# Patient Record
Sex: MALE | Race: BLACK OR AFRICAN AMERICAN | NOT HISPANIC OR LATINO | Employment: UNEMPLOYED | URBAN - METROPOLITAN AREA
[De-identification: names, ages, dates, MRNs, and addresses within clinical notes are randomized per-mention and may not be internally consistent; named-entity substitution may affect disease eponyms.]

---

## 2017-06-11 ENCOUNTER — HOSPITAL ENCOUNTER (EMERGENCY)
Facility: OTHER | Age: 42
Discharge: HOME | End: 2017-06-11
Attending: EMERGENCY MEDICINE

## 2017-06-11 VITALS
HEIGHT: 68 IN | HEART RATE: 62 BPM | WEIGHT: 145 LBS | OXYGEN SATURATION: 100 % | DIASTOLIC BLOOD PRESSURE: 122 MMHG | TEMPERATURE: 98 F | RESPIRATION RATE: 17 BRPM | BODY MASS INDEX: 21.98 KG/M2 | SYSTOLIC BLOOD PRESSURE: 197 MMHG

## 2017-06-11 DIAGNOSIS — I16.0 HYPERTENSIVE URGENCY: Primary | ICD-10-CM

## 2017-06-11 LAB
ALBUMIN SERPL BCP-MCNC: 3.6 G/DL
ALP SERPL-CCNC: 118 U/L
ALT SERPL W/O P-5'-P-CCNC: 8 U/L
ANION GAP SERPL CALC-SCNC: 10 MMOL/L
AST SERPL-CCNC: 15 U/L
BASOPHILS # BLD AUTO: 0.01 K/UL
BASOPHILS NFR BLD: 0.2 %
BILIRUB SERPL-MCNC: 0.3 MG/DL
BILIRUB UR QL STRIP: NEGATIVE
BUN SERPL-MCNC: 8 MG/DL
CALCIUM SERPL-MCNC: 8.8 MG/DL
CHLORIDE SERPL-SCNC: 104 MMOL/L
CLARITY UR: CLEAR
CO2 SERPL-SCNC: 24 MMOL/L
COLOR UR: YELLOW
CREAT SERPL-MCNC: 0.8 MG/DL
DIFFERENTIAL METHOD: ABNORMAL
EOSINOPHIL # BLD AUTO: 0.2 K/UL
EOSINOPHIL NFR BLD: 4.1 %
ERYTHROCYTE [DISTWIDTH] IN BLOOD BY AUTOMATED COUNT: 16.3 %
EST. GFR  (AFRICAN AMERICAN): >60 ML/MIN/1.73 M^2
EST. GFR  (NON AFRICAN AMERICAN): >60 ML/MIN/1.73 M^2
GLUCOSE SERPL-MCNC: 86 MG/DL
GLUCOSE UR QL STRIP: NEGATIVE
HCT VFR BLD AUTO: 37.5 %
HGB BLD-MCNC: 12.1 G/DL
HGB UR QL STRIP: NEGATIVE
KETONES UR QL STRIP: NEGATIVE
LEUKOCYTE ESTERASE UR QL STRIP: NEGATIVE
LYMPHOCYTES # BLD AUTO: 1.9 K/UL
LYMPHOCYTES NFR BLD: 45.7 %
MCH RBC QN AUTO: 27.3 PG
MCHC RBC AUTO-ENTMCNC: 32.3 %
MCV RBC AUTO: 85 FL
MONOCYTES # BLD AUTO: 0.2 K/UL
MONOCYTES NFR BLD: 4.1 %
NEUTROPHILS # BLD AUTO: 1.9 K/UL
NEUTROPHILS NFR BLD: 45.7 %
NITRITE UR QL STRIP: NEGATIVE
PH UR STRIP: 6 [PH] (ref 5–8)
PLATELET # BLD AUTO: 260 K/UL
PMV BLD AUTO: 9.2 FL
POTASSIUM SERPL-SCNC: 4 MMOL/L
PROT SERPL-MCNC: 7.2 G/DL
PROT UR QL STRIP: NEGATIVE
RBC # BLD AUTO: 4.43 M/UL
SODIUM SERPL-SCNC: 138 MMOL/L
SP GR UR STRIP: 1.01 (ref 1–1.03)
URN SPEC COLLECT METH UR: NORMAL
UROBILINOGEN UR STRIP-ACNC: NEGATIVE EU/DL
WBC # BLD AUTO: 4.16 K/UL

## 2017-06-11 PROCEDURE — 63600175 PHARM REV CODE 636 W HCPCS: Performed by: EMERGENCY MEDICINE

## 2017-06-11 PROCEDURE — 25000003 PHARM REV CODE 250: Performed by: EMERGENCY MEDICINE

## 2017-06-11 PROCEDURE — 93010 ELECTROCARDIOGRAM REPORT: CPT | Mod: ,,, | Performed by: INTERNAL MEDICINE

## 2017-06-11 PROCEDURE — 96372 THER/PROPH/DIAG INJ SC/IM: CPT

## 2017-06-11 PROCEDURE — 99284 EMERGENCY DEPT VISIT MOD MDM: CPT | Mod: 25

## 2017-06-11 PROCEDURE — 81003 URINALYSIS AUTO W/O SCOPE: CPT

## 2017-06-11 PROCEDURE — 93005 ELECTROCARDIOGRAM TRACING: CPT

## 2017-06-11 PROCEDURE — 80053 COMPREHEN METABOLIC PANEL: CPT

## 2017-06-11 PROCEDURE — 85025 COMPLETE CBC W/AUTO DIFF WBC: CPT

## 2017-06-11 RX ORDER — AMLODIPINE BESYLATE 10 MG/1
10 TABLET ORAL DAILY
Qty: 60 TABLET | Refills: 0 | OUTPATIENT
Start: 2017-06-11 | End: 2019-03-29

## 2017-06-11 RX ORDER — AMLODIPINE BESYLATE 5 MG/1
10 TABLET ORAL
Status: COMPLETED | OUTPATIENT
Start: 2017-06-11 | End: 2017-06-11

## 2017-06-11 RX ORDER — HYDROCHLOROTHIAZIDE 25 MG/1
25 TABLET ORAL
Status: COMPLETED | OUTPATIENT
Start: 2017-06-11 | End: 2017-06-11

## 2017-06-11 RX ORDER — CARVEDILOL 6.25 MG/1
6.25 TABLET ORAL 2 TIMES DAILY WITH MEALS
Qty: 120 TABLET | Refills: 0 | Status: SHIPPED | OUTPATIENT
Start: 2017-06-11 | End: 2019-03-29

## 2017-06-11 RX ORDER — KETOROLAC TROMETHAMINE 30 MG/ML
30 INJECTION, SOLUTION INTRAMUSCULAR; INTRAVENOUS
Status: COMPLETED | OUTPATIENT
Start: 2017-06-11 | End: 2017-06-11

## 2017-06-11 RX ORDER — HYDROCHLOROTHIAZIDE 25 MG/1
25 TABLET ORAL DAILY
Qty: 60 TABLET | Refills: 0 | Status: SHIPPED | OUTPATIENT
Start: 2017-06-11 | End: 2019-03-29

## 2017-06-11 RX ADMIN — AMLODIPINE BESYLATE 10 MG: 5 TABLET ORAL at 12:06

## 2017-06-11 RX ADMIN — KETOROLAC TROMETHAMINE 30 MG: 30 INJECTION, SOLUTION INTRAMUSCULAR at 12:06

## 2017-06-11 RX ADMIN — HYDROCHLOROTHIAZIDE 25 MG: 25 TABLET ORAL at 12:06

## 2017-06-11 NOTE — ED TRIAGE NOTES
Pt c/o H/A with blurred vision X 3 days. Pt checked pressure at home 215/111. Pt is out of medications, has not taken for 6 months.

## 2017-06-11 NOTE — ED NOTES
Patient Identifiers for Jerry Bliss checked and correct  LOC: The patient is awake, alert and aware of environment with an appropriate affect, the patient is oriented x 3 and speaking appropriate.  APPEARANCE: Patient resting comfortably and in no acute distress. The patient is clean and well groomed. The patient's clothing is properly fastened.  SKIN: The skin is warm and dry. The patient has normal skin turgor and moist mucus membranes.   Musculoskeletal :  Normal range of motion noted. Moves all extremities well.  RESPIRATORY: Airway is open and patent, respirations are spontaneous, patient has a normal effort and rate.   CARDIAC: Patient has a normal rate and rhythm, no peripheral edema noted, capillary refill < 3 seconds.   PULSES: 2+ radial pulses, symmetrical.  NEUROLOGIC: PERRL, Pupils 3 mm and reacts briskly to light. Motor strength 5/5 all extremities.  The pt's facial expression is symmetrical, patient moving all extremities, normal sensation in all extremities when touched with a finger.The patient is awake, alert and cooperative with a calm affect, patient is aware of environment.      Will continue to monitor

## 2017-06-11 NOTE — ED PROVIDER NOTES
Encounter Date: 6/11/2017    SCRIBE #1 NOTE: I, Alex White, am scribing for, and in the presence of, Dr. Casillas.       History     Chief Complaint   Patient presents with    Hypertension     pt with elevated  pressure x 3 days.  pt out  of  meds for six mths.pt with c/o  neck pain and blurred vision . aaox3 . gait steady      Review of patient's allergies indicates:   Allergen Reactions    Lisinopril Swelling     angioedema     Time seen by provider: 11:17 AM    This is a 41 y.o. male h/o HTN who presents to the ED with a chief complaint of elevated blood pressure. The patient notes that he has been out of his HCTZ, Coreg, and Norvasc for the past 5-6 months. He complains of a persistent posterior HA and neck pain, with associated blurred vision and photophobia for the past 3 days. He notes that his neck pain is worse with turning his head to the side. He reports no recent trauma or injury. The patient describes his HA as a throbbing. He notes a systolic BP of 211 this morning. He has had similar HA/neck pain and vision changes with elevated BP, getting episodes occasionally in the past few months, but now more persistent. The patient denies any leg swelling, CP, or SOB. He states that he does not have a PCP. He notes that he has had HTN for the past 25 years.            Past Medical History:   Diagnosis Date    Hypertension      History reviewed. No pertinent surgical history.  History reviewed. No pertinent family history.  Social History   Substance Use Topics    Smoking status: Current Every Day Smoker    Smokeless tobacco: Never Used    Alcohol use No     Review of Systems   Constitutional: Negative for fever.   HENT: Negative for sore throat.    Eyes: Positive for photophobia and visual disturbance.   Respiratory: Negative for shortness of breath.    Cardiovascular: Negative for chest pain and leg swelling.   Gastrointestinal: Negative for nausea.   Genitourinary: Negative for dysuria.    Musculoskeletal: Positive for neck pain. Negative for back pain.   Skin: Negative for rash.   Neurological: Positive for headaches. Negative for weakness.   Hematological: Does not bruise/bleed easily.       Physical Exam     Initial Vitals [06/11/17 1058]   BP Pulse Resp Temp SpO2   (!) 170/106 72 18 97.9 °F (36.6 °C) 99 %     Physical Exam    Nursing note and vitals reviewed.  Constitutional: He appears well-developed and well-nourished. He is not diaphoretic. No distress.   HENT:   Head: Normocephalic and atraumatic.   Right Ear: External ear normal.   Left Ear: External ear normal.   Mouth/Throat: Oropharynx is clear and moist.   Eyes: Conjunctivae and EOM are normal. Pupils are equal, round, and reactive to light. Right eye exhibits no discharge. Left eye exhibits no discharge.   Neck: Normal range of motion.   Bilateral lateral neck TTP, worse with turning of the head.    Cardiovascular: Normal rate, regular rhythm and normal heart sounds. Exam reveals no gallop and no friction rub.    No murmur heard.  Pulmonary/Chest: Breath sounds normal. No respiratory distress. He has no wheezes. He has no rhonchi. He has no rales.   Abdominal: Soft. There is no tenderness. There is no rebound and no guarding.   Musculoskeletal: Normal range of motion. He exhibits no edema or tenderness.   No midline C-T-L TTP.   Neurological: He is alert and oriented to person, place, and time. He has normal strength. No cranial nerve deficit or sensory deficit.   Skin: Skin is warm and dry. No rash and no abscess noted. No erythema. No pallor.   Psychiatric: He has a normal mood and affect. His behavior is normal. Judgment and thought content normal.         ED Course   Procedures  Labs Reviewed   COMPREHENSIVE METABOLIC PANEL - Abnormal; Notable for the following:        Result Value    ALT 8 (*)     All other components within normal limits   CBC W/ AUTO DIFFERENTIAL - Abnormal; Notable for the following:     RBC 4.43 (*)      Hemoglobin 12.1 (*)     Hematocrit 37.5 (*)     RDW 16.3 (*)     Mono # 0.2 (*)     All other components within normal limits   URINALYSIS     EKG Readings: (Independently Interpreted)   Rhythm: Normal Sinus Rhythm. Heart Rate: 62.   No acute ischemia. No change from previous EKG.     Imaging Results          CT Head Without Contrast (Final result)  Result time 06/11/17 12:11:26    Final result by Marcia Roberts MD (06/11/17 12:11:26)                 Impression:      #1. No significant abnormalities are identified.      Electronically signed by: MARCIA ROBERTS MD  Date:     06/11/17  Time:    12:11              Narrative:    HISTORY: Headache    COMPARISON: CT head 10/13/16    FINDINGS: The brain parenchyma is unremarkable with no evidence of hemorrhage, mass, or acute infarct.  The ventricular system is normal in size for age and demonstrates no distortion by mass effect.      No extra-axial hemorrhage or mass. The extracranial structures are unremarkable.  The osseous structures are unremarkable.                                 Medical Decision Making:   Initial Assessment:       41M with long h/o HTN, non-compliant with meds, presents with persistent HA, neck pain, blurry vision for 3 days. He ran out of all BP meds about 6 mo ago, and occasionally gets similar episodes of HA/neck pain/blurry vision with elevated BP for years, usually brief but more persistent this week, which is why he came to ED. No concerning CP/SOB/edema. No meningeal signs or neuro deficits, but given quality of HA and reported SBP>200 at home, will check CT head to ensure no ICH from HTN urgency. Will check labs to ensure no sign CKD or other adverse effect from longstanding poorly controlled BP, give home meds, and re-assess.    Update:  CT head (-), labs no acute process, EKG c/w previous. HA, neck pain much improved now, blurry vision resolved. BP still elevated but likely chronically elevated, no indication for further emergent  lowering. Will restart HCTZ/Norvasc first, and add Coreg in 1 week if BP still persistently elevated. Given 60 day Rx for all 3 and urged to establish PCP.                  Scribe Attestation:   Scribe #1: I performed the above scribed service and the documentation accurately describes the services I performed. I attest to the accuracy of the note.    Attending Attestation:           Physician Attestation for Scribe:  Physician Attestation Statement for Scribe #1: I, Dr. Casillas, reviewed documentation, as scribed by Alex White in my presence, and it is both accurate and complete.                 ED Course     Clinical Impression:     1. Hypertensive urgency                José Antonio Casillas MD  06/11/17 0898

## 2017-06-11 NOTE — ED NOTES
Rounding on the patient has been done. Pt is AAOx 4, no acute distress noted, vital signs monitoring in place. BP is noted to still be elevated will inform MD. Pain was assessed and is currently a pain level 5/10 . Comfort, positioning and restroom needs were addressed. The patient has been updated on the plan of care and current status. The call bell remains within reach with instructions of usage for any additional patient needs. The patient is resting comfortably in recliner with wife at side. Will continue to monitor.

## 2019-03-29 ENCOUNTER — HOSPITAL ENCOUNTER (EMERGENCY)
Facility: HOSPITAL | Age: 44
Discharge: HOME | End: 2019-03-29
Attending: EMERGENCY MEDICINE

## 2019-03-29 VITALS
WEIGHT: 150 LBS | RESPIRATION RATE: 16 BRPM | HEART RATE: 88 BPM | BODY MASS INDEX: 22.73 KG/M2 | TEMPERATURE: 99 F | OXYGEN SATURATION: 100 % | HEIGHT: 68 IN | DIASTOLIC BLOOD PRESSURE: 89 MMHG | SYSTOLIC BLOOD PRESSURE: 139 MMHG

## 2019-03-29 DIAGNOSIS — R51.9 NONINTRACTABLE HEADACHE, UNSPECIFIED CHRONICITY PATTERN, UNSPECIFIED HEADACHE TYPE: ICD-10-CM

## 2019-03-29 DIAGNOSIS — R04.0 EPISTAXIS: Primary | ICD-10-CM

## 2019-03-29 PROCEDURE — 99284 EMERGENCY DEPT VISIT MOD MDM: CPT | Mod: ,,, | Performed by: EMERGENCY MEDICINE

## 2019-03-29 PROCEDURE — 99284 EMERGENCY DEPT VISIT MOD MDM: CPT

## 2019-03-29 PROCEDURE — 99284 PR EMERGENCY DEPT VISIT,LEVEL IV: ICD-10-PCS | Mod: ,,, | Performed by: EMERGENCY MEDICINE

## 2019-03-29 PROCEDURE — 25000003 PHARM REV CODE 250: Performed by: EMERGENCY MEDICINE

## 2019-03-29 RX ORDER — AMLODIPINE BESYLATE 5 MG/1
5 TABLET ORAL DAILY
Qty: 20 TABLET | Refills: 0 | Status: SHIPPED | OUTPATIENT
Start: 2019-03-29 | End: 2019-04-18

## 2019-03-29 RX ORDER — ACETAMINOPHEN 325 MG/1
650 TABLET ORAL
Status: COMPLETED | OUTPATIENT
Start: 2019-03-29 | End: 2019-03-29

## 2019-03-29 RX ORDER — IBUPROFEN 400 MG/1
400 TABLET ORAL
Status: COMPLETED | OUTPATIENT
Start: 2019-03-29 | End: 2019-03-29

## 2019-03-29 RX ORDER — BUTALBITAL, ACETAMINOPHEN AND CAFFEINE 50; 325; 40 MG/1; MG/1; MG/1
1 TABLET ORAL EVERY 4 HOURS PRN
Qty: 15 TABLET | Refills: 0 | Status: SHIPPED | OUTPATIENT
Start: 2019-03-29 | End: 2019-04-28

## 2019-03-29 RX ORDER — ACETAMINOPHEN 500 MG
1000 TABLET ORAL
Status: DISCONTINUED | OUTPATIENT
Start: 2019-03-29 | End: 2019-03-29

## 2019-03-29 RX ORDER — LISINOPRIL 10 MG/1
10 TABLET ORAL DAILY
COMMUNITY
End: 2019-03-29

## 2019-03-29 RX ADMIN — ACETAMINOPHEN 650 MG: 325 TABLET ORAL at 01:03

## 2019-03-29 RX ADMIN — IBUPROFEN 400 MG: 400 TABLET, FILM COATED ORAL at 01:03

## 2019-03-29 NOTE — ED TRIAGE NOTES
Patient states he was blowing his nose with blood noted to both nares, states out of blood pressure medication x 3 weeks, states fatigue. No bleeding upon admit to ED. Lisinopril and Amlodipine last filled 12/10

## 2019-03-29 NOTE — ED PROVIDER NOTES
Encounter Date: 3/29/2019       History     Chief Complaint   Patient presents with    Epistaxis     had nose bleed for about 30 minutes this AM; headache; needs BP meds refilled; no active bleeding noted at this time     43-year-old male with past medical history of hypertension not on medications for the last 2 weeks, comes in with epistaxis.  Patient has had URI for the last few days with runny nose, was blowing his nose and cause mild bleeding.  Stops within a few minutes.  He also endorses a headache for the last week occipital aching.  This headache was progressive onset and is similar to his previous headaches.  He reports previously being diagnosed with migraines.  The nature of the pain is similar to prior.  He states he takes BC Powder for the symptoms and intermittently helps.  No weakness, numbness, tingling.  No change in vision.  No nausea vomiting. No recent trauma. He also reports being out of his blood pressure medications.  He is on lisinopril and amlodipine.  He also notes that he has had lip swelling from the lisinopril in the past.  No current lip swelling tongue swelling throat closing.  No fevers or cough.        Review of patient's allergies indicates:   Allergen Reactions    Lisinopril Swelling     angioedema     Past Medical History:   Diagnosis Date    Hypertension      History reviewed. No pertinent surgical history.  History reviewed. No pertinent family history.  Social History     Tobacco Use    Smoking status: Current Every Day Smoker     Packs/day: 2.00     Types: Cigarettes    Smokeless tobacco: Never Used   Substance Use Topics    Alcohol use: No    Drug use: Not Currently     Types: Marijuana     Review of Systems   Constitutional:  No Fever, No Chills,   Eyes: No Vision Changes  ENT/Mouth: No sore throat, + nasal congestion.  Cardiovascular:  No Chest Pain, No Palpitations  Respiratory:  No Cough, No SOB  Gastrointestinal:  No Nausea, No Vomiting, No Diarrhea, No abdo  pain.  Genitourinary:  No  pain, No dysuria   Musculoskeletal:  No Arthralgias, No Back Pain, No recent trauma.  Skin:  No skin Lesions  Neuro:  No Weakness, No Numbness, No Paresthesias, No Dizziness        Physical Exam     Initial Vitals [03/29/19 1236]   BP Pulse Resp Temp SpO2   139/89 88 16 98.6 °F (37 °C) 100 %      MAP       --         Physical Exam   Physical Exam:  GENERAL APPEARANCE: Well developed, well nourished, in no acute distress.  HENT: Normocephalic, atraumatic, nasal turbinates mildly inflamed, no active bleeding.  Throat is unremarkable    EYES: Sclerae anicteric   NECK: Supple, no thyroid enlargement  CARDIOVASCULAR: Regular rate and rhythm without any murmurs, gallops, rubs.  LUNGS: Speaking in full sentences. Breathing comfortably. Auscultation of the lungs revealed normal breath sounds b/l  ABDOMEN: Soft and nontender, no masses, no rebound or guarding   NEUROLOGIC: Alert, interacting normally. No facial droop.  no dysmetria 5/5 strength b/l upper and lower extremity, Normal gait.   MSK: Moving all four extremities.  Skin: Warm and dry. No visible rash on exposed areas of skin.    Psych: Mood and affect normal.       ED Course   Procedures  Labs Reviewed - No data to display       Imaging Results    None          Medical Decision Making:   Initial Assessment:   43-year-old male with epistaxis now resolved, likely secondary to lung nose and recent URI.  No active bleeding.  Instructed to place pressure on nose if any new bleeding and return to the emergency department if bleeding does not stop.    Headache with no red flag symptoms, similar prior, exam is benign, will treat with Tylenol Motrin in the emergency department.  Will attempt Fioricet at home, instructed not to drive while taking Fioricet.     Hypertension ran out of medications, blood pressure today is within normal limits. Will not refill lisinopril given history of angioedema.  Was previously on 10 mg of amlodipine will only  give 5 for now, the patient has follow-up with PMD in 2 weeks for continued management of his blood pressure.    Not consistent with dangerous pathology this time.    MDM Complexity Points:   Problem Points:  1.New problem, with no additional ED work-up planned (maximum of 1) - epistaxis  2.Self-limited or minor (maximum of 2) - medication refill    Data Points:  Decision to obtain old records (in the EHR)  Summary of old medical records - multiple previous visits ED for hypertensive emergencies.     Risk:  Low Risk                        Clinical Impression:       ICD-10-CM ICD-9-CM   1. Epistaxis R04.0 784.7   2. Nonintractable headache, unspecified chronicity pattern, unspecified headache type R51 784.0                                Nick Moore MD  03/29/19 9633

## 2019-03-29 NOTE — ED NOTES
Patient identifiers verified and correct for Mr Bliss  C/C: Med rx refill, nosebleed PTA  APPEARANCE: awake and alert in NAD.  SKIN: warm, dry and intact. No breakdown or bruising.  MUSCULOSKELETAL: Patient moving all extremities spontaneously, no obvious swelling or deformities noted. Ambulates independently.  RESPIRATORY: Denies shortness of breath.Respirations unlabored.   CARDIAC: Denies CP, 2+ distal pulses; no peripheral edema  ABDOMEN: S/ND/NT, Denies nausea  : voids spontaneously, denies difficulty  Neurologic: AAO x 4; follows commands equal strength in all extremities; denies numbness/tingling. Denies dizziness Positive dizziness upon standing in the morning, states blurred vision x 1 year

## 2019-04-05 ENCOUNTER — HOSPITAL ENCOUNTER (EMERGENCY)
Facility: HOSPITAL | Age: 44
Discharge: HOME | End: 2019-04-05
Attending: EMERGENCY MEDICINE
Payer: MEDICAID

## 2019-04-05 VITALS
HEART RATE: 78 BPM | WEIGHT: 150 LBS | HEIGHT: 68 IN | SYSTOLIC BLOOD PRESSURE: 163 MMHG | TEMPERATURE: 98 F | BODY MASS INDEX: 22.73 KG/M2 | DIASTOLIC BLOOD PRESSURE: 95 MMHG | RESPIRATION RATE: 18 BRPM | OXYGEN SATURATION: 100 %

## 2019-04-05 DIAGNOSIS — T78.40XA ALLERGIC REACTION, INITIAL ENCOUNTER: Primary | ICD-10-CM

## 2019-04-05 PROCEDURE — 63600175 PHARM REV CODE 636 W HCPCS: Performed by: EMERGENCY MEDICINE

## 2019-04-05 PROCEDURE — 99283 EMERGENCY DEPT VISIT LOW MDM: CPT

## 2019-04-05 PROCEDURE — 99284 PR EMERGENCY DEPT VISIT,LEVEL IV: ICD-10-PCS | Mod: ,,, | Performed by: EMERGENCY MEDICINE

## 2019-04-05 PROCEDURE — 25000003 PHARM REV CODE 250: Performed by: EMERGENCY MEDICINE

## 2019-04-05 PROCEDURE — 99284 EMERGENCY DEPT VISIT MOD MDM: CPT | Mod: ,,, | Performed by: EMERGENCY MEDICINE

## 2019-04-05 RX ORDER — DEXAMETHASONE 4 MG/1
8 TABLET ORAL
Status: COMPLETED | OUTPATIENT
Start: 2019-04-05 | End: 2019-04-05

## 2019-04-05 RX ORDER — DIPHENHYDRAMINE HCL 25 MG
50 CAPSULE ORAL EVERY 6 HOURS PRN
Qty: 20 CAPSULE | Refills: 0 | Status: SHIPPED | OUTPATIENT
Start: 2019-04-05

## 2019-04-05 RX ORDER — CETIRIZINE HYDROCHLORIDE 5 MG/1
10 TABLET ORAL
Status: COMPLETED | OUTPATIENT
Start: 2019-04-05 | End: 2019-04-05

## 2019-04-05 RX ORDER — CETIRIZINE HYDROCHLORIDE 10 MG/1
10 TABLET ORAL DAILY PRN
Qty: 20 TABLET | Refills: 0 | Status: SHIPPED | OUTPATIENT
Start: 2019-04-05 | End: 2020-04-04

## 2019-04-05 RX ADMIN — CETIRIZINE HYDROCHLORIDE 10 MG: 5 TABLET ORAL at 10:04

## 2019-04-05 RX ADMIN — DEXAMETHASONE 8 MG: 4 TABLET ORAL at 10:04

## 2019-04-05 NOTE — ED PROVIDER NOTES
Encounter Date: 4/5/2019    SCRIBE #1 NOTE: I, Catalina Nguyen, am scribing for, and in the presence of,  Dr. Moore. I have scribed the entire note.       History     Chief Complaint   Patient presents with    Facial Swelling     did men's dye to face     Time patient was seen by the provider: 10:16 AM      The patient is a 43 y.o. male with co-morbidities including: HTN, who presents to the ED with a complaint of facial swelling, rash and itchiness for 2 days. Patient reports he dyed his beard with hair dye 2 days go, reaction presented soon after. Swelling and rash around the area of is beard. No tongue swelling, lip swelling. He has not tried anything to treat symptoms. Denies chest pain, SOB, vomiting, diarrhea, fevers. Reports compliance with his BP meds.     The history is provided by the patient and medical records.     Review of patient's allergies indicates:   Allergen Reactions    Lisinopril Swelling     angioedema     Past Medical History:   Diagnosis Date    Hypertension      No past surgical history on file.  No family history on file.  Social History     Tobacco Use    Smoking status: Current Every Day Smoker     Packs/day: 2.00     Types: Cigarettes    Smokeless tobacco: Never Used   Substance Use Topics    Alcohol use: No    Drug use: Not Currently     Types: Marijuana     Review of Systems  Constitutional:  No Fever, No Chills,   Eyes: No Vision Changes  ENT/Mouth: No sore throat, No rhinorrhea Facial swelling  Cardiovascular:  No Chest Pain, No Palpitations  Respiratory:  No Cough, No SOB  Gastrointestinal:  No Nausea, No Vomiting, No Diarrhea, No abdo pain.  Genitourinary:  No  pain, No dysuria   Musculoskeletal:  No Arthralgias, No Back Pain, No Neck Pain, No recent trauma.  Skin:  No skin Lesions. Rash  Neuro:  No Weakness, No Numbness, No Paresthesias, No Dizziness, No Headache      Physical Exam     Initial Vitals [04/05/19 0950]   BP Pulse Resp Temp SpO2   (!) 163/95 78 18 98.4 °F  (36.9 °C) 100 %      MAP       --         Physical Exam    Nursing note and vitals reviewed.    Physical Exam:  GENERAL APPEARANCE: Well developed, well nourished, in no acute distress.  HENT: Normocephalic, atraumatic. Throat, tongue, and lip not swollen.    EYES: Sclerae anicteric   NECK: Supple  LUNGS: Breathing comfortably. Speaking in full sentences Clear to auscultations bilaterally.   NEUROLOGIC: Alert, interacting normally. No facial droop.   MSK: Moving all four extremities.  Skin: Warm and dry. Rash to left cheek, chin and above the lip line with some excoriations.    Psych: Mood and affect normal.       ED Course   Procedures  Labs Reviewed - No data to display       Imaging Results    None          Medical Decision Making:   History:   Old Medical Records: I decided to obtain old medical records.  Initial Assessment:   History on exam is consistent to allergic reaction to hair dye. Will give steroids and antihistamine. No signs of anaphylaxis. Patient to discontinue this product and will be discharged.   Seen recently in ED with elevated BP, but had reaction to lisinopril, is only on amlodpine, reports compliance with this med.   Blood pressure elevated, patient will continue to take prescription blood pressure medicine and follow up with PMD.   PMD follow up  ED return instructions.     MDM Complexity Points:   Problem Points:  1.New problem, with no additional ED work-up planned (maximum of 1) - Allergic reaction  2.Self-limited or minor (maximum of 2) - elevated blood pressure     Data Points:  Decision to obtain old records (in the EHR) and Review and summarization of old records                Scribe Attestation:   Scribe #1: I performed the above scribed service and the documentation accurately describes the services I performed. I attest to the accuracy of the note.               Clinical Impression:       ICD-10-CM ICD-9-CM   1. Allergic reaction, initial encounter T78.40XA 995.3          Disposition:   Disposition: Discharged  Condition: Stable                        Nick Moore MD  04/06/19 2045

## 2020-03-03 NOTE — ED NOTES
"Patient arrives for evaluation of rash to face after he "dyed" his beard 2 days ago - minimal swelling noted with rash under lower lip - denies breathing issues - no apparent distress - no drainage noted to rash    " no

## 2023-01-03 ENCOUNTER — HOSPITAL ENCOUNTER (EMERGENCY)
Facility: HOSPITAL | Age: 48
Discharge: LEFT BEFORE DISCHARGE | End: 2023-01-03
Payer: MEDICAID

## 2023-01-03 VITALS
WEIGHT: 140 LBS | DIASTOLIC BLOOD PRESSURE: 77 MMHG | TEMPERATURE: 99 F | SYSTOLIC BLOOD PRESSURE: 139 MMHG | HEART RATE: 95 BPM | RESPIRATION RATE: 20 BRPM | OXYGEN SATURATION: 100 % | HEIGHT: 68 IN | BODY MASS INDEX: 21.22 KG/M2

## 2023-01-03 DIAGNOSIS — R07.9 CHEST PAIN: ICD-10-CM

## 2023-01-03 PROCEDURE — 99900 PR LEFT WITHOUTBEING SEEN-EMERGENCY: ICD-10-PCS | Mod: ,,, | Performed by: EMERGENCY MEDICINE

## 2023-01-03 PROCEDURE — 99283 EMERGENCY DEPT VISIT LOW MDM: CPT

## 2023-01-03 PROCEDURE — 99281 EMR DPT VST MAYX REQ PHY/QHP: CPT

## 2023-01-03 PROCEDURE — 93005 ELECTROCARDIOGRAM TRACING: CPT

## 2023-01-03 PROCEDURE — 93010 EKG 12-LEAD: ICD-10-PCS | Mod: ,,, | Performed by: INTERNAL MEDICINE

## 2023-01-03 PROCEDURE — 99900 PR LEFT WITHOUTBEING SEEN-EMERGENCY: CPT | Mod: ,,, | Performed by: EMERGENCY MEDICINE

## 2023-01-03 PROCEDURE — 93010 ELECTROCARDIOGRAM REPORT: CPT | Mod: ,,, | Performed by: INTERNAL MEDICINE

## 2023-01-04 NOTE — FIRST PROVIDER EVALUATION
"Medical screening examination initiated.  I have conducted a focused provider triage encounter, findings are as follows:    Brief history of present illness:  Reports chest pain, SOB and dizziness for past week. Pain is substernal and radiates to his throat. Denies drug or alcohol use. Positive smoker with HTN    Vitals:    01/03/23 1813   BP: 139/77   Pulse: 95   Resp: 20   Temp: 98.7 °F (37.1 °C)   TempSrc: Oral   SpO2: 100%   Weight: 63.5 kg (140 lb)   Height: 5' 8" (1.727 m)       Pertinent physical exam:  Awake, alert. Unlabored breathing    Brief workup plan:  Cardiac    Preliminary workup initiated; this workup will be continued and followed by the physician or advanced practice provider that is assigned to the patient when roomed.  "